# Patient Record
(demographics unavailable — no encounter records)

---

## 2025-04-11 NOTE — ASSESSMENT
[FreeTextEntry1] : 72-year-old male history of schizophrenia, dyslipidemia, DM, HTN here for sleep eval.  #Sleep eval #[facility] - Pt was told that he snores at night.  - Daytime sleepiness & fatigue - BMI 46 - Sleep study ordered.

## 2025-04-11 NOTE — PHYSICAL EXAM
[No Acute Distress] : no acute distress [Normal Rate/Rhythm] : normal rate/rhythm [Normal S1, S2] : normal s1, s2 [No Murmurs] : no murmurs [No Resp Distress] : no resp distress [Clear to Auscultation Bilaterally] : clear to auscultation bilaterally [No Abnormalities] : no abnormalities

## 2025-04-11 NOTE — END OF VISIT
[] : Resident [FreeTextEntry3] : Snoring; daytime sleepiness In lab sleep study ordered  3 months follow up

## 2025-04-11 NOTE — HISTORY OF PRESENT ILLNESS
[TextBox_4] : 72-year-old male history of schizophrenia, dyslipidemia, DM, HTN here for sleep eval  Pt stated that he sleeps every night from 10 pm to 3 am. Pt was told that he snores at night. He also has some daytime sleepiness & fatigue. Pt naps 2 times during the day.  BMI 46. Pt smokes cigars for years.